# Patient Record
(demographics unavailable — no encounter records)

---

## 2024-10-24 NOTE — HISTORY OF PRESENT ILLNESS
[FreeTextEntry1] : Anibal is an 8-year-old boy referred for neuropsychological evaluation due to difficulty with reading.    His father, Jermaine Neff, reported specific academic difficulties involving his reading. He reported Anibal has difficulty reading words with more than two syllables and tends to guess instead of sounding them out, frequently saying words that aren't there. He also occasionally writes numbers backwards. He has been receiving speech therapy for reading intervention over the past two years, which has reportedly provided some improvement. Additionally, he takes tests in a separate setting with other students who have reading difficulties, where the questions are read aloud to them.  Additionally, Mr. Neff reported concerns in the following domains: -	Language: some difficulty understanding others and expressing himself (e.g., stories don't always make sense, parents have trouble understanding him), poor articulation of certain sounds.  -	Memory: tends to forget information, has to be reminded several times; benefits from repetition.  -	Emotional: seems down at times and expresses feelings of inadequacy. Anibal described his mood as "in between" and that he has mixed feelings surrounding school.   He also reported that Anibal is somewhat inattentive and hyperactive but believes it to be age-appropriate.   Developmental History: Pregnancy was uncomplicated; born full term via . Milestones were met on time.   Medical History / Physical Symptoms: None. Problems with sleep and appetite were denied.   Psychiatric History / Mood: See above; no additional psychiatric concerns or history.    Medications: None.  Family History: Learning difficulties, ADHD.  Educational History: Anibal is currently in the 3rd grade. He receives special education services through an Individualized Education Program (IEP), classified as a student with Speech or Language Impairment.  Specifically, he is in an integrated co-teaching classroom and he receives testing accommodations (e.g., separate location, 1.5 extended time, focusing prompts, tests read/reread, extra paper for math). He also receives speech-language therapy through his IEP, which is supplemented by additional speech-language therapy at home.   Psychosocial History: Multi-racial background (Malag[place], [place]er). Lives in [place], [place] with his mother, father, brother (age 6; has suspected ADHD), and dog. Monolingual English speaker.

## 2024-10-24 NOTE — PHYSICAL EXAM
[Average] : average [Cooperative] : cooperative [Euthymic] : euthymic [Full] : full [Linear/Goal Directed] : linear/goal directed [WNL] : within normal limits [de-identified] : Difficulty with articulation

## 2024-11-11 NOTE — PHYSICAL EXAM
[Average] : average [Cooperative] : cooperative [Euthymic] : euthymic [Full] : full [Linear/Goal Directed] : linear/goal directed [WNL] : within normal limits [de-identified] : Difficulty with articulation

## 2024-11-11 NOTE — HISTORY OF PRESENT ILLNESS
[FreeTextEntry1] : Anibal is an 8-year-old boy referred for neuropsychological evaluation due to difficulty with reading.    His father, Jermaine Neff, reported specific academic difficulties involving his reading. He reported Anibal has difficulty reading words with more than two syllables and tends to guess instead of sounding them out, frequently saying words that aren't there. He also occasionally writes numbers backwards. He has been receiving speech therapy for reading intervention over the past two years, which has reportedly provided some improvement. Additionally, he takes tests in a separate setting with other students who have reading difficulties, where the questions are read aloud to them.  Additionally, Mr. Neff reported concerns in the following domains: -	Language: some difficulty understanding others and expressing himself (e.g., stories don't always make sense, parents have trouble understanding him), poor articulation of certain sounds.  -	Memory: tends to forget information, has to be reminded several times; benefits from repetition.  -	Emotional: seems down at times and expresses feelings of inadequacy. Anibal described his mood as "in between" and that he has mixed feelings surrounding school.   He also reported that Anibal is somewhat inattentive and hyperactive but believes it to be age-appropriate.   Developmental History: Pregnancy was uncomplicated; born full term via . Milestones were met on time.   Medical History / Physical Symptoms: None. Problems with sleep and appetite were denied.   Psychiatric History / Mood: See above; no additional psychiatric concerns or history.    Medications: None.  Family History: Learning difficulties, ADHD.  Educational History: Anibal is currently in the 3rd grade. He receives special education services through an Individualized Education Program (IEP), classified as a student with Speech or Language Impairment.  Specifically, he is in an integrated co-teaching classroom and he receives testing accommodations (e.g., separate location, 1.5 extended time, focusing prompts, tests read/reread, extra paper for math). He also receives speech-language therapy through his IEP, which is supplemented by additional speech-language therapy at home.   Psychosocial History: Multi-racial background ([place], Finnish). Lives in [place], [place] with his mother, father, brother (age 6; has suspected ADHD), and dog. Monolingual English speaker.

## 2024-11-11 NOTE — DISCUSSION/SUMMARY
Patient due for fasting office visit- one refill given.  Routing to team to schedule appointment     Graciela PHILLIP RN  [facility]  [phone]     [FreeTextEntry8] : Completed evaluation. Results will follow.

## 2024-12-05 NOTE — REASON FOR VISIT
[Patient preference] : as per patient preference [Telehealth (audio & video) - Individual/Group] : This visit was provided via telehealth using real-time 2-way audio visual technology. [Medical Office: ([facility])___] : The provider was located at the medical office in [unfilled]. [Home] : The patient, [unfilled], was located at home, [unfilled], at the time of the visit. [Feedback of results of neuropsychological evaluation] : Feedback of results of neuropsychological evaluation [Collateral without patient] : Collateral without patient [Mother] : mother [FreeTextEntry1] : Neuropsychological Evaluation Report Personal Information	Evaluation Information Name: Anibal Neff	Date of Interview: 2024 YOB: 2016	Date of Evaluation: 10/21/2024; 2024 Racial/Ethnic Identity: Multi-racial	Referring Provider: Self-Referred  Primary Language: English	Provider: Rd Rodrigues, Ph.D., [facility] Education: 3rd Grade 	Examiner: Mariana Burgos M.A.  The following information was obtained during an interview with Anibal, his father, Jermaine Neff, a review of medical records, and a psychometric evaluation (see appendix). ________________________________________  Referral & Background Anibal is an 8-year-old right-handed boy referred for neuropsychological evaluation to characterize his functioning due to concerns regarding his reading abilities. According to his parents, Anibal has difficulty with sounding out words (phonemic decoding), especially when reading multisyllabic words. Although he is fluent when he reads out loud, he makes some reading errors (i.e., saying the wrong word), which they attribute to him guessing at words, rather than sounding them out. This difficulty with reading is beginning to impact his math performance because he has trouble comprehending word problems. He reads at home for 25 minutes on Monday-Thursday and is increasingly interested in reading some chapter books and comic books. Per his Individualized Education Program (IEP), he "cannot yet read grade-levels texts independently" and has difficulty with phonemic decoding, which is impacting his comprehension. He is currently engaged in speech therapy weekly for the past two years and receives small group reading instruction at school, but has not participated in a specialized reading program.  His mother also described that Anibal has some difficulty with writing, including messy handwriting, difficulties with proper spacing, reversal of "b" and "d", and letter formation. He requires more time to complete writing assignments than other homework.   Previous Evaluations: -	Speech/Language Evaluation, May 2022: o	Findings were significant for "mild receptive-expressive language impairment", including weaknesses in comprehension of complex information and composition of oral narratives.  o	Informal reading assessment indicated inability to blend sounds together to form words. o	"Severe articulation disorder" was observed, especially with /r/, "er", and "sh" sounds.  Developmental and Medical History:  Prenatal history was unremarkable. Anibal was delivered via  and weighed approximately 8 pounds. Medical records indicated patent foramen ovale and peripheral branch pulmonary artery stenosis at 3 weeks post-birth, which resolved naturally.    Psychiatric History:  -	None. Anibal's father reported that although he has some anxiety around his academic performance, Anibal typically has an "even mood."   Current Medications:  -	None.   Family History:  -	ADHD (sibling). -	Learning disability possibly in parents.   Education:  -	Anibal is in third grade. He has had an IEP with Integrated Co-Teaching (ICT) classroom placement since 2024. As part of his IEP, Anibal has a classification of "Speech or Language Impairment" and receives the following additional accommodations and services:   o	Small group reading instruction o	Speech and Language services o	Extended time for tests o	Separate location with minimal distractions  o	Focusing prompts o	Tests read aloud on all tests except those that measure reading comprehension  o	Extra paper for math -	Current grades are in the 2 range for reading, English, and math, indicating that he is functioning just below grade standards in those areas. He currently has 3 (meeting grade standards) in science. Standardized reading assessment in 2024 showed reading skills in the 36th percentile and overall reading skills to be approaching the 3rd grade level.   Psychosocial History: Anibal lives with his parents and 6-year-old brother in [place], [place]. According to parent report, he functions well socially and is involved in various extracurricular activities, including soccer, basketball, chess, and [employer] classes.  ________________________________________  Presentation  Appearance:  -	Eye contact was appropriate.  -	Appeared his chronological age.  -	Appropriately dressed and groomed.   Speech and Language:  -	Receptive language was intact.  -	Expressive speech was normal for rate, volume, and tone. Of note, articulation difficulty was observed throughout the exam, particularly with /r/ sounds.  Thought Processes:  -	Linear and goal directed.   Mood and Affect:  -	Mood was euthymic with congruent affect.  Engagement:  -	Engaged appropriately during the evaluation. Of note, Anibal was extremely agreeable and did not require breaks during the cognitive exam. He appeared to put forth his best effort throughout and the results are believed to be a valid estimate of his current level of functioning.   ________________________________________  Results Overall intellectual functioning was in the average range, with particular strengths noted in verbal comprehension and processing speed.   Attention:  -	Basic attention span was grossly intact, but he had some difficulty with mentally manipulating information (working memory).   -	Anibal exhibited issues remaining vigilant. Specifically, there was a pattern of slower responses when required to maintain his attention for longer periods of time.  -	Anibal's parents and teachers did not report concerns with attention.  Processing Speed:  -	Processing speed was in the high average range and an area of relative strength.  Executive Functioning:  -	Executive function was generally intact, specifically in problem solving, initiation, and abstract reasoning. -	Anibal had mild difficulties with cognitive flexibility. He demonstrated a tendency to favor speed over accuracy.  -	His parents did not report any significant executive difficulties. His teachers, however, reported some difficulty with organization and monitoring his work. Both Anibal's parents and his teachers reported that he has a tendency to make careless mistakes and get distracted easily.  Visuospatial Functioning:  -	Visuomotor construction of geometric figures was intact.   Language: -	Receptive language was intact.  -	Expressive language was also intact. Spontaneous speech was developmentally appropriate, repetition was grossly intact, rapid word retrieval was especially strong, and Anibal was able to generate descriptive sentences about a scene.   Memory: -	Learning, retrieval, and retention of unstructured verbal information were all intact.  -	Anibal was also able to learn new verbal information presented in a story format, with intact retrieval and strong retention over time.   Academic Functioning:  -	Anibal had some difficulty with oral reading. Compared to his strengths in other areas of cognitive functioning, reading accuracy and fluency were weak and his reading performance was slightly below grade level when compared to other peers his age.  -	Further, phonological processing, a core cognitive ability that provides the language foundation for reading, was variable, with specific difficulties isolating individual sounds that make up words. -	Importantly, fluency, or the ability to rapidly process symbols, and reading comprehension, were within the normal range for his age. -	Math skills were well within expectation.   Behavior: -	On parent- and teacher-rated questionnaires, there was no significant endorsement of concerning behaviors. Anibal's parents noted that he generally has a "positive attitude."   Mood: -	On parent-related questionnaires, Anibal's parents did not report clinically significant mood symptoms. His teachers reported mild concerns about low energy. ________________________________________  Impressions The results of the evaluation revealed mild concern for Anibal's reading ability and phonological processing. Difficulties in these areas of cognition are associated with specific brain regions (e.g., angular gyrus, left prefrontal cortex) associated with linguistic processing. These difficulties emerged early in Anibal's development and have persisted even after the implementation of additional support (e.g., regular speech therapy and increased academic support). 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  Of note, reading is supported by two core linguistic abilities: fluency and phonological processing. Although Anibal does experience difficulty with phonological processing, his ability to rapidly identify symbols (fluency) is fully within normal limits. This relative strength in fluency likely partially compensates for weakness in phonological processing and may explain why Anbial's current weakness in reading is mild, and not more severe. That said, this relative weakness in reading represents a true area of difficulty given Anibal's strengths in most other cognitive functions (i.e., basic attention, processing speed, problem solving, initiation, visual construction, language, and memory). For a bright child like Anibal, this mild reading difficulty could restrict his potential, especially as reading skills become more critical for overall academic performance beyond elementary school.  Furthermore, this weakness in reading could impact Anibal behaviorally. Generally, his parents and teachers express that he is an agreeable child, and that was certainly the impression he left during the evaluation process. However, his teachers did identify some mild concerns regarding low energy. Difficulties with reading may contribute to a general sense of academic difficulty and Anibla may feel less motivated to engage in school and homework. Continued support in developing strong reading habits through specific interventions may help to boost his confidence and ensure he maintains interest in academics.  Anibal exhibited secondary deficits in sustained attention and working memory. Ratings from parents and teachers indicated some distractibility, inorganization, and tendency for careless mistakes, symptoms that are typically associated with disruptions in frontal systems, or brain regions responsible for attention regulation. However, during the evaluation, Anibal seemed well-focused and goal-oriented. He tended to value speed over accuracy, but most assessed executive functions were well within normal limits and any areas of weakness were mild. As such, an attentional disorder is not considered at this time.   Anibal has a classification of "Speech or Language Impairment" on his current IEP and a previous speech evaluation identified "mild receptive-expressive language impairment." On the current exam, there was no evidence of a language disorder. In fact, receptive and expressive language abilities were well within normal limits, including strong rapid word retrieval and excellent auditory comprehension. At this time, Anibal's performance does not indicate an underlying language impairment. Importantly, the main impression that Anibal left was one of a sweet and friendly child who loves his family and has various interests. Anibal was motivated and required no redirection. Overall, he put forth his best effort and was a true pleasure to work with.  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	Continuation of Academic Supports: It is recommended that Anibal continue to receive and implement services of his IEP plan in order to optimize learning and academic achievement. He would benefit from specific intervention aimed at improving his fluency and reading ability.  -	A specific intervention aimed at remediating his reading is suggested and should include both in classroom instruction and a supplemental program administered out of the classroom. Examples of these programs include: o	 Early Intervention in Reading o	The Optimize Intervention Program o	The Read, Write & Type! Learning System o	Patricia Murdock -	These programs are specifically aimed at remediating reading difficulties. The core principles of these programs are similar and specific interventions to assist in implementing them may include: o	Phonologic processing training through Speech and Language Therapy. This should include instruction of phonemic awareness and identification of the sounds of letters and letter strings. Anibal is currently receiving Speech Language therapy and benefits from this intervention may help to explain why observed weaknesses on the cognitive exam are mild in nature. It is strongly recommended that he continue to engage in this intervention. o	Daily individual or small group reading instruction with a focus on improving reading accurately and fluently. This should initially entail accurate reading and spelling of words and progress to teach fluent reading of words, phrases, and text. o	A specialized period with instruction that focuses on reading instruction and vocabulary building. Anibal should be encouraged to pre-read assignments during this period, focusing on accurate understanding of the instructions of his assignments and reading material. He should be provided with ample teacher support in assisting him to understand the material, copying words he finds difficult to pronounce, looking up their meaning, making a sentence with the word, and potentially drawing a picture associated with the sentence. This process will prepare him to complete his assignments at home, where he should be assisted by an adult who can provide further assistance in helping him read accurately and understand the material. o	Daily reading at home, which should include the following components.  ?	15 minutes of daily reading ?	Reading to others and hearing others read ?	Reading books that are enjoyable to Anibal, like [employer] books or the [employer] series. ?	Reading books at and below his reading level -	It is important that open communication exists between the different instructors (including Anibal's parents), and a formal communication plan should be implemented to ensure Anibal's instruction is consistent.   2.	Behavioral Strategies: Although Anibal's attentional issues are mild, he may benefit from a behavioral intervention to assist in him developing strategies to compensate. Some potential strategies are listed below, but he would benefit from weekly assistance in implementing those and other strategies.  -	Identifying a fixed place for studying that is quiet and free of distractions. The existing accommodation in his current [facility] to work in a quiet environment is likely beneficial. -	Setting small goals to make the workload easier (e.g., break up study sessions by units or chapters).  -	Setting aside a certain time each day to study, preferably when most alert.  -	Relaxing completely before beginning to study to avoid feeling tense and anxious while studying.  -	Reviewing past exams to consider how test-taking skills could be improved.  -	Reviewing material right after class so that it is fresh in memory.  -	Avoiding cramming. Instead, space out studying, review class material several times a week, and focus on one topic at a time.  -	Learning general topics first (i.e., main ideas) and then focusing on details.  -	Understanding the material well, rather than reading through the material in an effort to memorize.   3.	Additional Resources: There are several resources that may prove to be helpful in understanding Anibal's learning difficulties and attention deficit: -	Books: o	Straight talk about reading: How parents can make a difference during the early years by RICKEY Saeed and SJ Rose. o	Overcoming dyslexia: A new and complete science-based program for reading problems at any level by SORAIDA Richardson.  o	Smart but Scattered by BRISEYDA Sanches and NERY Christianson.  o	Answers to Distraction by LADY May and EDELMIRA Ram   -	Websites: o	Learning Disabilities Association of [place]: www.ldaamerica.org o	International Dyslexia Association: www.dyslexiaida.org o	Children and Adults with Attention-Deficit/Hyperactivity Disorder (KHURRAM) holds a podcast series that address a variety of topics for individuals dealing with ADHD https://khurram.org/podcasts/    4.	Continued Family Support: Parents remain one of the most influential resources in their children's lives. Anibal's parents should be commended for their clear desire to support their son for academic and personal success. It is clear that Anibal cares for his family and enjoys spending time with them, including playing video games with his father and brother. It is strongly recommended that the family continue to work together to support Anibal's progress. This will include continued education support as well as unstructured leisure time to enjoy each other's company.  5.	Follow-Up: Anibal is encouraged to return for follow-up neuropsychological evaluations when he transitions into middle school to assess his level of functioning, which can guide treatment and academic interventions.

## 2025-06-13 NOTE — REASON FOR VISIT
[Initial Evaluation] : an initial evaluation for [Mother] : mother [FreeTextEntry2] : recurrent croup

## 2025-06-13 NOTE — HISTORY OF PRESENT ILLNESS
[No Personal or Family History of Easy Bruising, Bleeding, or Issues with General Anesthesia] : No Personal or Family History of easy bruising, bleeding, or issues with general anesthesia [de-identified] : 9 year old boy presents for recurrent croup  Had croup about 3 times this year. Last episode about 1.5 weeks ago, completed liquid steroid treatment and nebulizer after Urgent Care visit.  Symptoms include barking cough and throat pain.  Now having more dry cough.  Denies dysphagia, aspirations, dyspnea Denies nasal congestion, anterior rhinorrhea, snoring concerns  Hx of heart murmur as child, but resolved. No current cardiologist follow up.